# Patient Record
(demographics unavailable — no encounter records)

---

## 2024-10-28 NOTE — DISCUSSION/SUMMARY
[de-identified] : 72f with MRI of the left knee with djd, mmt/lmt, effusion and small popliteal cyst 1) discussed availability of visco injections  2) cryotherapy, rest and activity modification  3) hep, tylenol prn 4) rtc when ready for injections.    Entered by Verona Ordaz acting as scribe. Dr. Pressley- The documentation recorded by the scribe accurately reflects the service I personally performed and the decisions made by me.

## 2024-10-28 NOTE — HISTORY OF PRESENT ILLNESS
[de-identified] : 10/28/24: Here to f/up left knee. Reports that with the use of voltaren gel and occasional use of a brace she was able to get through her trip in [place]. Reports that she does have continued knee pain, aching mostly after increased activity. C/o pain over the anterior and medial aspect. Has d/c use of meloxican, d/t upset stomach, has been taking tylenol prn.  9/27/24: Pt here to f/u L knee pain. States feeling much better, and the clicking has gone away. Mild intermittent pain medial knee/tibia that goes away after some time also. Using meloxicam PRN. Finished PT yesterday, Leaving for [place] wednesday for 3 weeks.  8/30/24: Here to f/up left knee and review MRI results. Wearing playmaker brace. Continued pain.  08/15/2024 Ms. CHACORTA REESE, a 72 year old female, presents today for left knee.  Reports left knee pain present over the last 3 weeks.  Feels it may be related to a fitness / weight training class started in early June and has been doing a lot of lower body weight training. Pain aggravated with sustained sitting. Describes pain as throbbing and has occasional clicking over the knee. Difficulty when getting out of a chair and the knee can buckle. Has tried heat/elevation, magnesium/biofreeze, Tylenol and was recently prescribed meloxicam by her pcp.  Retired

## 2024-10-28 NOTE — PHYSICAL EXAM
[Left] : left knee [NL (140)] : flexion 140 degrees [5___] : hamstring 5[unfilled]/5 [Negative] : negative Felipe's [] : mildly antalgic

## 2025-01-03 NOTE — DATA REVIEWED
[de-identified] : Audio 1/3/25: -Type Ad Tymp AU -Moderately severe SNHL 250-8000 Hz AU -80% discrimination AD and 88% discrimination AS at 85 dB -essentially stable from 10/2023

## 2025-01-03 NOTE — CONSULT LETTER
[Dear  ___] : Dear  [unfilled], [Courtesy Letter:] : I had the pleasure of seeing your patient, [unfilled], in my office today. [Please see my note below.] : Please see my note below. [Consult Closing:] : Thank you very much for allowing me to participate in the care of this patient.  If you have any questions, please do not hesitate to contact me. [Sincerely,] : Sincerely, [FreeTextEntry3] :  Aureliano Fleming MD FACS

## 2025-01-03 NOTE — HISTORY OF PRESENT ILLNESS
[de-identified] : Pt. with h/o dizziness, lots of mucus, left ear feeling clogged, crackling/popping in ears, PND, silent GERD, and small hiatal hernia presents today for a follow up. She states that she is doing well. She states that she can definitely feel better out of her left bear than her right ear. She states that she irritated her palate or back of tongue while eating recently. She states that she is still wearing bilateral hearing aids.

## 2025-01-03 NOTE — ADDENDUM
[FreeTextEntry1] :  Documented by Chico Olivas acting as scribe for Dr. Fleming on 01/03/2025. All Medical record entries made by the Scribe were at my, Dr. Fleming, direction and personally dictated by me on 01/03/2025 . I have reviewed the chart and agree that the record accurately reflects my personal performance of the history, physical exam, assessment and plan. I have also personally directed, reviewed, and agreed with the discharge instructions.

## 2025-01-03 NOTE — PHYSICAL EXAM
[] : septum deviated to the left [Midline] : trachea located in midline position [Normal] : no rashes [Hearing Loss Right Only] : normal [Hearing Loss Left Only] : normal [de-identified] : inflamed and allergic turbinates bilaterally [de-identified] : class 1 [de-identified] : palate normal

## 2025-01-03 NOTE — ASSESSMENT
[FreeTextEntry1] :  Reviewed and reconciled medications, allergies, PMHx, PSHx, SocHx, FMHx.  Pt. with h/o dizziness, lots of mucus, left ear feeling clogged, crackling/popping in ears, PND, silent GERD, and small hiatal hernia presents today for a follow up. She states that she is doing well. She states that she can definitely feel better out of her left bear than her right ear. She states that she irritated her palate or back of tongue while eating recently. She states that she is still wearing bilateral hearing aids.  Physical exam: -ears appear rosa isela bilaterally -deviated septum left -inflamed and allergic turbinates bilaterally -class 1 tonsils -palate normal  ENT Procedure: Flexible laryngoscopy -large swollen turbinates -nasopharynx appears normal -mild edema of the postcricoid, arytenoids and interarytenoids -epiglottis normal -symmetric lymphoid hyperplasia at the BOT  Audio 1/3/25: -Type Ad Tymp AU -Moderately severe SNHL 250-8000 Hz AU -80% discrimination AD and 88% discrimination AS at 85 dB -essentially stable from 10/2023  Plan: Audio - results interpreted by Dr. Fleming and reviewed with the patient. -FU in 1 year

## 2025-01-03 NOTE — PROCEDURE
[Complicated Symptoms] : complicated symptoms requiring more thorough examination than provided by mirror [de-identified] :  Procedure: Flexible Fiberoptic Laryngoscopy: Risks, benefits, and alternatives of flexible endoscopy were explained to the patient. The patient gave oral consent to proceed. The flexible scope was inserted into the right nasal cavity and advanced towards the nasopharynx. Visualized mucosa over the turbinates and septum were as described above. There were large swollen turbinates. The nasopharynx was clear. Oropharyngeal walls were symmetric and mobile without lesion, mass, or edema. Hypopharynx was also without lesion or edema. Larynx was mobile without lesions. Supraglottic structures were free of mass and asymmetry, but mild edema of the postcricoid, arytenoids and interarytenoids. True vocal folds were white without mass or lesion. Base of tongue was with symmetric lymphoid hyperplasia. -large swollen turbinates -nasopharynx appears normal -mild edema of the postcricoid, arytenoids and interarytenoids -epiglottis normal -symmetric lymphoid hyperplasia at the BOT

## 2025-07-25 NOTE — DISCUSSION/SUMMARY
[Medication Risks Reviewed] : Medication risks reviewed [de-identified] : reviewed the case and the imaging with the patient  LUMBAR SPONDYLOSIS  discussion of the condition and treatment options cautions discussed questions answered discussion of natural history of the condition and what the next step would be MRI l spine cont with PT flexeril TPI tolerated well today fu to review the MRi

## 2025-07-25 NOTE — PROCEDURE
[Trigger point 1-2 muscle groups] : trigger point 1-2 muscle groups [Right] : of the right [Lumbar paraspinal muscle] : lumbar paraspinal muscle [Pain] : pain [Alcohol] : alcohol [Betadine] : betadine [Ethyl Chloride sprayed topically] : ethyl chloride sprayed topically [___ cc    1%] : Lidocaine ~Vcc of 1%  [___ cc    0.25%] : Bupivacaine (Marcaine) ~Vcc of 0.25%  [___ cc    10mg] : Triamcinolone (Kenalog) ~Vcc of 10 mg  [Risks, benefits, alternatives discussed / Verbal consent obtained] : the risks benefits, and alternatives have been discussed, and verbal consent was obtained [FreeTextEntry3] : the pain was 8 before and 4 after the injection

## 2025-07-25 NOTE — HISTORY OF PRESENT ILLNESS
[8] : 8 [de-identified] : 7/2/5/25:   72- year- old female presents with complaints of low back pain that began since May 2025 after getting in/out of truck. Symptoms worsen with ambulation.  Denies radicular pain, n/t.  She has been going to PT, completed course of acupuncture and utilize topical agents for management of symptoms. No injections or surgery  PmHx: hypothyroid, depression/anxiety, HLD, Hx of R breast Ca s/p chemo, RT, s/p lumpectomy ([id number]), thalassemia minor Intestinal obstruction 2/2 adhesions, B cataracts/repair, [place] w/ aides, Asthma, GERD All: Amoxicillin (hives, wheeze)  xrays today: L spine - lumbar spondylosis/degen scoliosis ap pelvis - NEGATIVE  [] : no [FreeTextEntry5] : patient is here for lower back pain. No injury/fall. Experiencing tight/throbbing pain since May. Denies radiating pain. Doing PT. last tried acupuncture 4 weeks ago. Taking Advil